# Patient Record
Sex: FEMALE | URBAN - METROPOLITAN AREA
[De-identification: names, ages, dates, MRNs, and addresses within clinical notes are randomized per-mention and may not be internally consistent; named-entity substitution may affect disease eponyms.]

---

## 2019-04-12 ENCOUNTER — TELEPHONE (OUTPATIENT)
Dept: SURGERY | Facility: CLINIC | Age: 69
End: 2019-04-12

## 2019-04-12 NOTE — TELEPHONE ENCOUNTER
"Called pt about an appt I was scheduling for her per Dr Andujar. The pt sounded very angry that I called and said she knew nothing about an appt with Dr Figueroa and wanted to know why she would see him. I told her it was due to a polyp found on c/s. She said she has her own GI doctor. I tried to explain it was for a rectal polyp but she said "I have my own doctors" Told her I would let the referring doctor know.  "